# Patient Record
Sex: FEMALE | Race: OTHER
[De-identification: names, ages, dates, MRNs, and addresses within clinical notes are randomized per-mention and may not be internally consistent; named-entity substitution may affect disease eponyms.]

---

## 2017-10-10 ENCOUNTER — HOSPITAL ENCOUNTER (INPATIENT)
Dept: HOSPITAL 8 - ED | Age: 32
LOS: 6 days | Discharge: HOME | DRG: 100 | End: 2017-10-16
Attending: INTERNAL MEDICINE | Admitting: INTERNAL MEDICINE
Payer: MEDICARE

## 2017-10-10 VITALS — HEIGHT: 66 IN | WEIGHT: 218.04 LBS | BODY MASS INDEX: 35.04 KG/M2

## 2017-10-10 VITALS — SYSTOLIC BLOOD PRESSURE: 114 MMHG | DIASTOLIC BLOOD PRESSURE: 68 MMHG

## 2017-10-10 VITALS — DIASTOLIC BLOOD PRESSURE: 79 MMHG | SYSTOLIC BLOOD PRESSURE: 112 MMHG

## 2017-10-10 DIAGNOSIS — F10.20: ICD-10-CM

## 2017-10-10 DIAGNOSIS — Z59.0: ICD-10-CM

## 2017-10-10 DIAGNOSIS — E60: ICD-10-CM

## 2017-10-10 DIAGNOSIS — E72.20: ICD-10-CM

## 2017-10-10 DIAGNOSIS — G40.209: Primary | ICD-10-CM

## 2017-10-10 DIAGNOSIS — G93.40: ICD-10-CM

## 2017-10-10 DIAGNOSIS — E87.2: ICD-10-CM

## 2017-10-10 DIAGNOSIS — K72.90: ICD-10-CM

## 2017-10-10 DIAGNOSIS — Z91.19: ICD-10-CM

## 2017-10-10 DIAGNOSIS — F15.90: ICD-10-CM

## 2017-10-10 LAB
APAP SERPL-MCNC: < 2 MCG/ML (ref 10–30)
AST SERPL-CCNC: 32 U/L (ref 15–37)
BUN SERPL-MCNC: 8 MG/DL (ref 7–18)
DAU SCREEN: (no result)
HCG UR LOT: (no result)
HCG UR OBC: (no result)
HCT VFR BLD CALC: 43.8 % (ref 34.6–47.8)
HGB BLD-MCNC: 14.3 G/DL (ref 11.7–16.4)
IS PT STATUS REG ER OR PRE ER?: YES
VALPROATE SERPL-MCNC: < 3 MCG/ML (ref 50–100)
WBC # BLD AUTO: 8.1 X10^3/UL (ref 3.4–10)

## 2017-10-10 PROCEDURE — 84100 ASSAY OF PHOSPHORUS: CPT

## 2017-10-10 PROCEDURE — 0T9B70Z DRAINAGE OF BLADDER WITH DRAINAGE DEVICE, VIA NATURAL OR ARTIFICIAL OPENING: ICD-10-PCS | Performed by: NURSE PRACTITIONER

## 2017-10-10 PROCEDURE — 80307 DRUG TEST PRSMV CHEM ANLYZR: CPT

## 2017-10-10 PROCEDURE — 90686 IIV4 VACC NO PRSV 0.5 ML IM: CPT

## 2017-10-10 PROCEDURE — 80074 ACUTE HEPATITIS PANEL: CPT

## 2017-10-10 PROCEDURE — 72156 MRI NECK SPINE W/O & W/DYE: CPT

## 2017-10-10 PROCEDURE — 84443 ASSAY THYROID STIM HORMONE: CPT

## 2017-10-10 PROCEDURE — 84484 ASSAY OF TROPONIN QUANT: CPT

## 2017-10-10 PROCEDURE — 84630 ASSAY OF ZINC: CPT

## 2017-10-10 PROCEDURE — 83735 ASSAY OF MAGNESIUM: CPT

## 2017-10-10 PROCEDURE — 83605 ASSAY OF LACTIC ACID: CPT

## 2017-10-10 PROCEDURE — 70450 CT HEAD/BRAIN W/O DYE: CPT

## 2017-10-10 PROCEDURE — 70553 MRI BRAIN STEM W/O & W/DYE: CPT

## 2017-10-10 PROCEDURE — 85610 PROTHROMBIN TIME: CPT

## 2017-10-10 PROCEDURE — G0480 DRUG TEST DEF 1-7 CLASSES: HCPCS

## 2017-10-10 PROCEDURE — 84439 ASSAY OF FREE THYROXINE: CPT

## 2017-10-10 PROCEDURE — 86762 RUBELLA ANTIBODY: CPT

## 2017-10-10 PROCEDURE — 80048 BASIC METABOLIC PNL TOTAL CA: CPT

## 2017-10-10 PROCEDURE — 87077 CULTURE AEROBIC IDENTIFY: CPT

## 2017-10-10 PROCEDURE — 71010: CPT

## 2017-10-10 PROCEDURE — 81001 URINALYSIS AUTO W/SCOPE: CPT

## 2017-10-10 PROCEDURE — 86696 HERPES SIMPLEX TYPE 2 TEST: CPT

## 2017-10-10 PROCEDURE — 80076 HEPATIC FUNCTION PANEL: CPT

## 2017-10-10 PROCEDURE — 83873 ASSAY OF CSF PROTEIN: CPT

## 2017-10-10 PROCEDURE — 36415 COLL VENOUS BLD VENIPUNCTURE: CPT

## 2017-10-10 PROCEDURE — 82140 ASSAY OF AMMONIA: CPT

## 2017-10-10 PROCEDURE — 87899 AGENT NOS ASSAY W/OPTIC: CPT

## 2017-10-10 PROCEDURE — 86038 ANTINUCLEAR ANTIBODIES: CPT

## 2017-10-10 PROCEDURE — 95819 EEG AWAKE AND ASLEEP: CPT

## 2017-10-10 PROCEDURE — 85025 COMPLETE CBC W/AUTO DIFF WBC: CPT

## 2017-10-10 PROCEDURE — 87086 URINE CULTURE/COLONY COUNT: CPT

## 2017-10-10 PROCEDURE — 80164 ASSAY DIPROPYLACETIC ACD TOT: CPT

## 2017-10-10 PROCEDURE — 86695 HERPES SIMPLEX TYPE 1 TEST: CPT

## 2017-10-10 PROCEDURE — A9585 GADOBUTROL INJECTION: HCPCS

## 2017-10-10 PROCEDURE — 72157 MRI CHEST SPINE W/O & W/DYE: CPT

## 2017-10-10 PROCEDURE — 80053 COMPREHEN METABOLIC PANEL: CPT

## 2017-10-10 PROCEDURE — 82042 OTHER SOURCE ALBUMIN QUAN EA: CPT

## 2017-10-10 PROCEDURE — 86777 TOXOPLASMA ANTIBODY: CPT

## 2017-10-10 PROCEDURE — 87529 HSV DNA AMP PROBE: CPT

## 2017-10-10 PROCEDURE — 82784 ASSAY IGA/IGD/IGG/IGM EACH: CPT

## 2017-10-10 PROCEDURE — 82040 ASSAY OF SERUM ALBUMIN: CPT

## 2017-10-10 PROCEDURE — 90732 PPSV23 VACC 2 YRS+ SUBQ/IM: CPT

## 2017-10-10 PROCEDURE — 96374 THER/PROPH/DIAG INJ IV PUSH: CPT

## 2017-10-10 PROCEDURE — 81025 URINE PREGNANCY TEST: CPT

## 2017-10-10 PROCEDURE — 93005 ELECTROCARDIOGRAM TRACING: CPT

## 2017-10-10 PROCEDURE — 80201 ASSAY OF TOPIRAMATE: CPT

## 2017-10-10 PROCEDURE — G0435 ORAL HIV-1/HIV-2 SCREEN: HCPCS

## 2017-10-10 PROCEDURE — 86778 TOXOPLASMA ANTIBODY IGM: CPT

## 2017-10-10 PROCEDURE — 87252 VIRUS INOCULATION TISSUE: CPT

## 2017-10-10 PROCEDURE — G0479 DRUG TEST PRESUMP NOT OPT: HCPCS

## 2017-10-10 PROCEDURE — 85651 RBC SED RATE NONAUTOMATED: CPT

## 2017-10-10 PROCEDURE — 86703 HIV-1/HIV-2 1 RESULT ANTBDY: CPT

## 2017-10-10 PROCEDURE — 80329 ANALGESICS NON-OPIOID 1 OR 2: CPT

## 2017-10-10 PROCEDURE — 96360 HYDRATION IV INFUSION INIT: CPT

## 2017-10-10 PROCEDURE — 62270 DX LMBR SPI PNXR: CPT

## 2017-10-10 PROCEDURE — 86645 CMV ANTIBODY IGM: CPT

## 2017-10-10 RX ADMIN — LACTULOSE SCH GM: 10 SOLUTION ORAL at 20:43

## 2017-10-10 RX ADMIN — LACTULOSE SCH GM: 10 SOLUTION ORAL at 11:00

## 2017-10-10 RX ADMIN — LACTULOSE SCH GM: 10 SOLUTION ORAL at 16:00

## 2017-10-10 RX ADMIN — DEXTROSE, SODIUM CHLORIDE, AND POTASSIUM CHLORIDE SCH MLS/HR: 5; .45; .15 INJECTION INTRAVENOUS at 16:04

## 2017-10-10 RX ADMIN — Medication SCH MG: at 20:44

## 2017-10-10 RX ADMIN — ENOXAPARIN SODIUM SCH MG: 40 INJECTION SUBCUTANEOUS at 16:04

## 2017-10-10 SDOH — ECONOMIC STABILITY - HOUSING INSECURITY: HOMELESSNESS: Z59.0

## 2017-10-11 VITALS — SYSTOLIC BLOOD PRESSURE: 122 MMHG | DIASTOLIC BLOOD PRESSURE: 80 MMHG

## 2017-10-11 VITALS — DIASTOLIC BLOOD PRESSURE: 71 MMHG | SYSTOLIC BLOOD PRESSURE: 104 MMHG

## 2017-10-11 VITALS — DIASTOLIC BLOOD PRESSURE: 72 MMHG | SYSTOLIC BLOOD PRESSURE: 106 MMHG

## 2017-10-11 VITALS — SYSTOLIC BLOOD PRESSURE: 108 MMHG | DIASTOLIC BLOOD PRESSURE: 75 MMHG

## 2017-10-11 LAB
AST SERPL-CCNC: 26 U/L (ref 15–37)
BUN SERPL-MCNC: 9 MG/DL (ref 7–18)
HCT VFR BLD CALC: 37.6 % (ref 34.6–47.8)
HGB BLD-MCNC: 12.3 G/DL (ref 11.7–16.4)
WBC # BLD AUTO: 6.6 X10^3/UL (ref 3.4–10)

## 2017-10-11 RX ADMIN — LACTULOSE SCH GM: 10 SOLUTION ORAL at 21:00

## 2017-10-11 RX ADMIN — DEXTROSE, SODIUM CHLORIDE, AND POTASSIUM CHLORIDE SCH MLS/HR: 5; .45; .15 INJECTION INTRAVENOUS at 01:53

## 2017-10-11 RX ADMIN — LACTULOSE SCH GM: 10 SOLUTION ORAL at 09:00

## 2017-10-11 RX ADMIN — Medication SCH MG: at 09:12

## 2017-10-11 RX ADMIN — DEXTROSE, SODIUM CHLORIDE, AND POTASSIUM CHLORIDE SCH MLS/HR: 5; .45; .15 INJECTION INTRAVENOUS at 11:23

## 2017-10-11 RX ADMIN — LACTULOSE SCH GM: 10 SOLUTION ORAL at 16:00

## 2017-10-11 RX ADMIN — ENOXAPARIN SODIUM SCH MG: 40 INJECTION SUBCUTANEOUS at 11:23

## 2017-10-11 RX ADMIN — DEXTROSE, SODIUM CHLORIDE, AND POTASSIUM CHLORIDE SCH MLS/HR: 5; .45; .15 INJECTION INTRAVENOUS at 22:04

## 2017-10-11 RX ADMIN — Medication SCH MG: at 22:03

## 2017-10-12 VITALS — DIASTOLIC BLOOD PRESSURE: 65 MMHG | SYSTOLIC BLOOD PRESSURE: 102 MMHG

## 2017-10-12 VITALS — SYSTOLIC BLOOD PRESSURE: 102 MMHG | DIASTOLIC BLOOD PRESSURE: 67 MMHG

## 2017-10-12 VITALS — DIASTOLIC BLOOD PRESSURE: 73 MMHG | SYSTOLIC BLOOD PRESSURE: 106 MMHG

## 2017-10-12 VITALS — DIASTOLIC BLOOD PRESSURE: 66 MMHG | SYSTOLIC BLOOD PRESSURE: 107 MMHG

## 2017-10-12 LAB
ANA SER QL: (no result)
AST SERPL-CCNC: 26 U/L (ref 15–37)
BUN SERPL-MCNC: 7 MG/DL (ref 7–18)
HCT VFR BLD CALC: 38.4 % (ref 34.6–47.8)
HGB BLD-MCNC: 12.6 G/DL (ref 11.7–16.4)
WBC # BLD AUTO: 6.3 X10^3/UL (ref 3.4–10)

## 2017-10-12 RX ADMIN — DEXTROSE, SODIUM CHLORIDE, AND POTASSIUM CHLORIDE SCH MLS/HR: 5; .45; .15 INJECTION INTRAVENOUS at 10:25

## 2017-10-12 RX ADMIN — Medication SCH MG: at 21:28

## 2017-10-12 RX ADMIN — ENOXAPARIN SODIUM SCH MG: 40 INJECTION SUBCUTANEOUS at 11:00

## 2017-10-12 RX ADMIN — Medication SCH MG: at 10:26

## 2017-10-12 RX ADMIN — LACTULOSE SCH GM: 10 SOLUTION ORAL at 10:26

## 2017-10-13 VITALS — SYSTOLIC BLOOD PRESSURE: 95 MMHG | DIASTOLIC BLOOD PRESSURE: 67 MMHG

## 2017-10-13 VITALS — DIASTOLIC BLOOD PRESSURE: 62 MMHG | SYSTOLIC BLOOD PRESSURE: 104 MMHG

## 2017-10-13 VITALS — DIASTOLIC BLOOD PRESSURE: 66 MMHG | SYSTOLIC BLOOD PRESSURE: 101 MMHG

## 2017-10-13 VITALS — SYSTOLIC BLOOD PRESSURE: 98 MMHG | DIASTOLIC BLOOD PRESSURE: 67 MMHG

## 2017-10-13 LAB — BUN SERPL-MCNC: 13 MG/DL (ref 7–18)

## 2017-10-13 RX ADMIN — ENOXAPARIN SODIUM SCH MG: 40 INJECTION SUBCUTANEOUS at 12:14

## 2017-10-13 RX ADMIN — LEVETIRACETAM SCH MG: 500 TABLET ORAL at 20:48

## 2017-10-13 RX ADMIN — ACETAMINOPHEN PRN MG: 325 TABLET, FILM COATED ORAL at 17:18

## 2017-10-13 RX ADMIN — Medication SCH MG: at 10:31

## 2017-10-13 RX ADMIN — ACETAMINOPHEN PRN MG: 325 TABLET, FILM COATED ORAL at 10:34

## 2017-10-14 VITALS — DIASTOLIC BLOOD PRESSURE: 68 MMHG | SYSTOLIC BLOOD PRESSURE: 101 MMHG

## 2017-10-14 VITALS — SYSTOLIC BLOOD PRESSURE: 101 MMHG | DIASTOLIC BLOOD PRESSURE: 70 MMHG

## 2017-10-14 VITALS — DIASTOLIC BLOOD PRESSURE: 82 MMHG | SYSTOLIC BLOOD PRESSURE: 134 MMHG

## 2017-10-14 VITALS — SYSTOLIC BLOOD PRESSURE: 103 MMHG | DIASTOLIC BLOOD PRESSURE: 70 MMHG

## 2017-10-14 LAB
BUN SERPL-MCNC: 16 MG/DL (ref 7–18)
HCT VFR BLD CALC: 44.2 % (ref 34.6–47.8)
HGB BLD-MCNC: 14.5 G/DL (ref 11.7–16.4)
WBC # BLD AUTO: 10.9 X10^3/UL (ref 3.4–10)

## 2017-10-14 RX ADMIN — LEVETIRACETAM SCH MG: 500 TABLET ORAL at 09:51

## 2017-10-14 RX ADMIN — ACETAMINOPHEN PRN MG: 325 TABLET, FILM COATED ORAL at 20:16

## 2017-10-14 RX ADMIN — LEVETIRACETAM SCH MG: 500 TABLET ORAL at 20:16

## 2017-10-14 RX ADMIN — ENOXAPARIN SODIUM SCH MG: 40 INJECTION SUBCUTANEOUS at 11:00

## 2017-10-15 VITALS — DIASTOLIC BLOOD PRESSURE: 67 MMHG | SYSTOLIC BLOOD PRESSURE: 98 MMHG

## 2017-10-15 VITALS — SYSTOLIC BLOOD PRESSURE: 105 MMHG | DIASTOLIC BLOOD PRESSURE: 70 MMHG

## 2017-10-15 VITALS — SYSTOLIC BLOOD PRESSURE: 104 MMHG | DIASTOLIC BLOOD PRESSURE: 74 MMHG

## 2017-10-15 VITALS — DIASTOLIC BLOOD PRESSURE: 70 MMHG | SYSTOLIC BLOOD PRESSURE: 103 MMHG

## 2017-10-15 LAB — BUN SERPL-MCNC: 15 MG/DL (ref 7–18)

## 2017-10-15 RX ADMIN — ZINC SULFATE CAP 220 MG (50 MG ELEMENTAL ZN) SCH MG: 220 (50 ZN) CAP at 10:08

## 2017-10-15 RX ADMIN — LEVETIRACETAM SCH MG: 500 TABLET ORAL at 20:28

## 2017-10-15 RX ADMIN — ACETAMINOPHEN PRN MG: 325 TABLET, FILM COATED ORAL at 14:17

## 2017-10-15 RX ADMIN — ENOXAPARIN SODIUM SCH MG: 40 INJECTION SUBCUTANEOUS at 10:08

## 2017-10-15 RX ADMIN — LEVETIRACETAM SCH MG: 500 TABLET ORAL at 10:08

## 2017-10-16 VITALS — SYSTOLIC BLOOD PRESSURE: 99 MMHG | DIASTOLIC BLOOD PRESSURE: 66 MMHG

## 2017-10-16 VITALS — SYSTOLIC BLOOD PRESSURE: 103 MMHG | DIASTOLIC BLOOD PRESSURE: 67 MMHG

## 2017-10-16 VITALS — DIASTOLIC BLOOD PRESSURE: 64 MMHG | SYSTOLIC BLOOD PRESSURE: 98 MMHG

## 2017-10-16 PROCEDURE — B01B1ZZ FLUOROSCOPY OF SPINAL CORD USING LOW OSMOLAR CONTRAST: ICD-10-PCS | Performed by: RADIOLOGY

## 2017-10-16 PROCEDURE — 009U3ZX DRAINAGE OF SPINAL CANAL, PERCUTANEOUS APPROACH, DIAGNOSTIC: ICD-10-PCS | Performed by: RADIOLOGY

## 2017-10-16 RX ADMIN — ACETAMINOPHEN PRN MG: 325 TABLET, FILM COATED ORAL at 07:39

## 2017-10-16 RX ADMIN — ZINC SULFATE CAP 220 MG (50 MG ELEMENTAL ZN) SCH MG: 220 (50 ZN) CAP at 07:39

## 2017-10-16 RX ADMIN — ENOXAPARIN SODIUM SCH MG: 40 INJECTION SUBCUTANEOUS at 09:32

## 2017-10-16 RX ADMIN — ACETAMINOPHEN PRN MG: 325 TABLET, FILM COATED ORAL at 13:50

## 2017-10-16 RX ADMIN — LEVETIRACETAM SCH MG: 500 TABLET ORAL at 07:39

## 2017-10-19 LAB
ALB CSF/SERPL: 4 {RATIO} (ref 0–8)
ALBUMIN CSF-MCNC: 15 MG/DL (ref 11–48)
ALBUMIN SERPL-MCNC: 3.8 G/DL (ref 3.5–5.5)
IGG SERPL-MCNC: 1130 MG/DL (ref 700–1600)
IGG SYNTH RATE SER+CSF CALC-MRATE: -3.8 MG/DAY
IGG/ALB CLEAR SER+CSF-RTO: 0.5 (ref 0–0.7)
IGG/ALB CSF: 0.14 {RATIO} (ref 0–0.25)
MBP CSF-MCNC: 0.8 NG/ML (ref 0–1.2)

## 2017-12-26 ENCOUNTER — HOSPITAL ENCOUNTER (EMERGENCY)
Dept: HOSPITAL 8 - ED | Age: 32
Discharge: HOME | End: 2017-12-26
Payer: MEDICARE

## 2017-12-26 VITALS — BODY MASS INDEX: 25.51 KG/M2 | HEIGHT: 66 IN | WEIGHT: 158.73 LBS

## 2017-12-26 VITALS — SYSTOLIC BLOOD PRESSURE: 134 MMHG | DIASTOLIC BLOOD PRESSURE: 42 MMHG

## 2017-12-26 DIAGNOSIS — N76.0: ICD-10-CM

## 2017-12-26 DIAGNOSIS — B96.89: ICD-10-CM
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
## 2017-12-26 DIAGNOSIS — G40.909: ICD-10-CM

## 2017-12-26 DIAGNOSIS — A56.09: Primary | ICD-10-CM
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
## 2017-12-26 DIAGNOSIS — S62.347A: ICD-10-CM

## 2017-12-26 LAB — T VAGINALIS RRNA GENITAL QL PROBE: (no result)

## 2017-12-26 PROCEDURE — 73130 X-RAY EXAM OF HAND: CPT

## 2017-12-26 PROCEDURE — 29125 APPL SHORT ARM SPLINT STATIC: CPT

## 2017-12-26 PROCEDURE — 87808 TRICHOMONAS ASSAY W/OPTIC: CPT

## 2017-12-26 PROCEDURE — 87491 CHLMYD TRACH DNA AMP PROBE: CPT

## 2017-12-26 PROCEDURE — 99285 EMERGENCY DEPT VISIT HI MDM: CPT

## 2017-12-26 PROCEDURE — 87591 N.GONORRHOEAE DNA AMP PROB: CPT

## 2017-12-26 PROCEDURE — 87210 SMEAR WET MOUNT SALINE/INK: CPT

## 2017-12-26 PROCEDURE — 96372 THER/PROPH/DIAG INJ SC/IM: CPT

## 2018-01-06 ENCOUNTER — HOSPITAL ENCOUNTER (EMERGENCY)
Dept: HOSPITAL 8 - ED | Age: 33
LOS: 1 days | Discharge: HOME | End: 2018-01-07
Payer: MEDICARE

## 2018-01-06 VITALS — HEIGHT: 66 IN | BODY MASS INDEX: 25.4 KG/M2 | WEIGHT: 158.07 LBS
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
## 2018-01-06 DIAGNOSIS — R07.9: ICD-10-CM

## 2018-01-06 DIAGNOSIS — F15.10: Primary | ICD-10-CM

## 2018-01-06 DIAGNOSIS — F17.200: ICD-10-CM

## 2018-01-06 DIAGNOSIS — R51: ICD-10-CM

## 2018-01-06 DIAGNOSIS — Z98.51: ICD-10-CM
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
## 2018-01-06 DIAGNOSIS — Z72.89: ICD-10-CM

## 2018-01-06 DIAGNOSIS — Z90.49: ICD-10-CM

## 2018-01-06 DIAGNOSIS — H92.01: ICD-10-CM
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
## 2018-01-06 DIAGNOSIS — M25.532: ICD-10-CM
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
## 2018-01-06 DIAGNOSIS — Z88.2: ICD-10-CM

## 2018-01-06 PROCEDURE — 71046 X-RAY EXAM CHEST 2 VIEWS: CPT

## 2018-01-06 PROCEDURE — 99284 EMERGENCY DEPT VISIT MOD MDM: CPT

## 2018-01-07 VITALS — DIASTOLIC BLOOD PRESSURE: 84 MMHG | SYSTOLIC BLOOD PRESSURE: 132 MMHG

## 2018-01-24 ENCOUNTER — HOSPITAL ENCOUNTER (EMERGENCY)
Dept: HOSPITAL 8 - ED | Age: 33
Discharge: HOME | End: 2018-01-24
Payer: MEDICARE

## 2018-01-24 VITALS — HEIGHT: 66 IN | BODY MASS INDEX: 24.13 KG/M2 | WEIGHT: 150.13 LBS

## 2018-01-24 VITALS — SYSTOLIC BLOOD PRESSURE: 114 MMHG | DIASTOLIC BLOOD PRESSURE: 77 MMHG

## 2018-01-24 DIAGNOSIS — G40.909: Primary | ICD-10-CM

## 2018-01-24 DIAGNOSIS — Z59.0: ICD-10-CM

## 2018-01-24 PROCEDURE — 99283 EMERGENCY DEPT VISIT LOW MDM: CPT

## 2018-01-24 SDOH — ECONOMIC STABILITY - HOUSING INSECURITY: HOMELESSNESS: Z59.0

## 2020-04-10 NOTE — NUR
THIS IS A 35 YO F W/ C/O SOB, DIZZINESS AND COUGH SINCE YESTERDAY. PT REPORTS 
SYMPTOMS ARE WORSE AT NIGHT. DENIES ABD PAIN/CP. RESP EVEN AND UNLABORED. NADN. 
PT CONVERSING IN FULL SENTENCES W/O DIFFICULTY. PT IS TACHYCARDIC, OTHER VS 
WDL. PIV STARTED AND LABS DRAWN. PT IS RESTING ON [employer]NEY W/ CALL LIGHT IN 
REACH. DENIES FURTHER NEEDS AT THIS TIME.

## 2021-01-27 NOTE — NUR
discharge instructions reviewed with patient. no further questions. 
prescription handed directly to patient. all personal belongings with patient 
on dc. no IV was placed. steady gait with ambulation to lobby

## 2021-01-31 NOTE — NUR
BIBA FOR [facility] EXACERBATION. PT STATES THE COLD WEATHER BRINGS ON ATTACKS FOR 
HER AND SHE DID NOT HAVE HER INHLAER SO SHE CALELD 911. MARIA VICTORIA GAVE 1 DUONEB AND 
1 ALBUTEROL TX EN ROUTE.

## 2021-05-15 NOTE — NUR
PT BIB [facility]-PT HAD 3 SZ TODAY, 3RD SZ PT WAS SLOW TO COME BACK. ON SCENE PT 
WAS A&0X4, UNABLE TO OBTAIN IV, NO INTERVENTIONS PTA. 



PT A&OX4, PT LAST SZ WAS 2 WEEK AGO, HAS NOT BEEN TAKING TOPAMAX. PT REPORTS 
LAST USE OF METH AND ETOH WAS YESTERDAY. 



MD CURRENTLY BEDSIDE.

## 2021-05-15 NOTE — NUR
DAVIDAR REPORT GIVEN TO TRAE. 

-------------------------------------------------------------------------------

Addendum: 05/15/21 at 1901 by RIANNA

-------------------------------------------------------------------------------

PT A&OX4, NADN. REG/UNLABORED RESP. BEDRAIL UPX2, CALL LIGHT WITHIN REACH.

## 2021-05-15 NOTE — NUR
PT MEDICATED, SEE MAR. MED REQUEST SENT TO PHARMACY FOR TOPIRMATE. PT HTN, OAND 
TACHYCARDIC. NADN. CALL LIGHT WITHIN REACH.

## 2021-06-14 NOTE — NUR
PT C/O OF RLQ PAIN SINCE 2 DAYS AGO.

PT COMPLAINS OF MALAISE THROUGHOUT BODY, NAUSEA, AND DIARRHEA

REPORTS HAVING A HEADACHE, STATES TAKING 4 TABS OF TYLENOL. 

STATES HOT AND COLD FLASHES



ATTACHED TO CARD//BP MONITORS. VSS WITH ELEVATED HR.

BED IN LOW NPOSITION, RAILS ENGAGED, CALL LIGHT WITHIN REACH.

FRIEND AT BEDSIDE. [facility]

## 2021-06-14 NOTE — NUR
MD PERFORMED EJ IV. MD RECOMMENDED TO INSTILL ROCEPHIN FIRST.

ATTACHED TO MONITORS. VSS. BF AT BEDSIDE

Patient is resting comfortably in bed. Bed in lowest, rails engaged, call light 
on lap.

Vital Signs within normal limits. WCTM.

## 2021-06-14 NOTE — NUR
pt resting in bed, a&o, resps even and unlabored, vss, nadn, call light in 
reach, friend at bedside. awaiting lab results and dispo.

## 2021-06-14 NOTE — NUR
pt left from floor AMA, states they "made a mistake" and wants to receive 
treatment. pt took 3-4 tylenol every 3-4 hours for 48 hours. pt states abd pain 
throughout. pt a&o, resps even and unlabored, vss, nadn.

## 2021-06-14 NOTE — NUR
task rn: Patient/Caregiver given discharge instructions and they have confirmed 
that they understand the instructions.  Patient ambulatory with steady gait. 
NAD, all questions answered appropriately, denies additional needs at this 
time. No personal belongings left in room after discharge.

## 2021-06-14 NOTE — NUR
Task RN: multiple IV attempts with no success.  Patient to be given oral meds 
and CT without contrast. Primary RN notified.

## 2021-06-14 NOTE — NUR
-------------------------------------------------------------------------------

            *** Note julianaone in EDM - 06/14/21 at 1129 by MICHELLE ***             

-------------------------------------------------------------------------------

pt left from floor AMA, states they "made a mistake" and wants to receive 
treatment. pt took 3-4 tylenol every 3-4 hours for 48 hours. pt states abd pain 
throughout. pt a&o, resps even and unlabored, rahat ash.

## 2021-06-19 NOTE — NUR
PT D/C WITH D/C SUMMARY AND SCRIPT. PT QUESTIONS ANSWERED. PT EDUCATED ON NEED 
FOR USE OF ANTICONVULSANT MEDICATIONS AND VERBALIZES UNDERSTANDING. PT DENIES 
ANY OTHER NEEDS PERTAINING TO THIS VISIT AND AMBULATES TO REGISTRATION DESK 
WITH STEADY GAIT FOR D/C HOME WITH FRIEND. PT PROVIDED A BUS PASS PER PT 
REQUEST.

## 2021-06-19 NOTE — NUR
PT WITH PMH OF SZ DISORDER AND ALCOHOLISM WAS BELIEVED BY SIGNIFICANT OTHER TO 
HAVE 2 WITNESSED SEIZURES TODAY [place]. PT FOUND BY EMS POST ICTAL WITH 
[facility]. PT AAO X 4 UPON ARRIVAL TO [facility] ED. PT NON COMPLIANT WITH SZ DISORDER 
MEDICATIONS. EMS REPORTS PT USED METHAMPHETAMINE TODAY. PT DENIES ETOH USE 
TODAY.  UPON ARRIVAL TO [facility] ED, PT PLACED ON CARDIAC AND VS MONITORS. PT VSS. 
SEIZURE PADS PLACED ON GURNEY WITH PROPER SZ PRECAUTIONS IN PLACE. PT EDUCATED 
ON ER PROCESS AND POC AND VERBALIZES UNDERSTANDING. AWAITING ORDER FROM MD AT 
THIS TIME.